# Patient Record
Sex: FEMALE | Race: WHITE | NOT HISPANIC OR LATINO | URBAN - METROPOLITAN AREA
[De-identification: names, ages, dates, MRNs, and addresses within clinical notes are randomized per-mention and may not be internally consistent; named-entity substitution may affect disease eponyms.]

---

## 2020-04-03 ENCOUNTER — EMERGENCY (EMERGENCY)
Facility: HOSPITAL | Age: 30
LOS: 0 days | Discharge: HOME | End: 2020-04-03
Payer: COMMERCIAL

## 2020-04-03 VITALS
DIASTOLIC BLOOD PRESSURE: 62 MMHG | RESPIRATION RATE: 18 BRPM | OXYGEN SATURATION: 100 % | HEART RATE: 51 BPM | TEMPERATURE: 99 F | SYSTOLIC BLOOD PRESSURE: 98 MMHG

## 2020-04-03 DIAGNOSIS — U07.1 COVID-19: ICD-10-CM

## 2020-04-03 DIAGNOSIS — J34.89 OTHER SPECIFIED DISORDERS OF NOSE AND NASAL SINUSES: ICD-10-CM

## 2020-04-03 DIAGNOSIS — R05 COUGH: ICD-10-CM

## 2020-04-03 DIAGNOSIS — R50.9 FEVER, UNSPECIFIED: ICD-10-CM

## 2020-04-03 PROCEDURE — 99283 EMERGENCY DEPT VISIT LOW MDM: CPT

## 2020-04-03 PROCEDURE — 87635 SARS-COV-2 COVID-19 AMP PRB: CPT

## 2020-04-03 NOTE — ED STATDOCS - OBJECTIVE STATEMENT
Pt with no PMH presents to ED with cough, runny nose, body aches, back pain x 5 days. Pt is an RN. Pt states she was recently exposed to COVID-19. Pt here for testing.

## 2020-04-03 NOTE — ED STATDOCS - PATIENT PORTAL LINK FT
You can access the FollowMyHealth Patient Portal offered by [facility] by registering at the following website: [email or web address]. By joining [facility] FollowMyHealth portal, you will also be able to view your health information using other applications (apps) compatible with our system.

## 2020-04-05 LAB — SARS-COV-2 RNA SPEC QL NAA+PROBE: (no result)

## 2024-02-16 NOTE — ED STATDOCS - NS_EDPROVIDERDISPOUSERTYPE_ED_A_ED
I have personally evaluated and examined the patient. The Attending was available to me as a supervising provider if needed. home